# Patient Record
Sex: FEMALE | Race: BLACK OR AFRICAN AMERICAN | NOT HISPANIC OR LATINO | Employment: FULL TIME | URBAN - METROPOLITAN AREA
[De-identification: names, ages, dates, MRNs, and addresses within clinical notes are randomized per-mention and may not be internally consistent; named-entity substitution may affect disease eponyms.]

---

## 2023-06-27 ENCOUNTER — SOCIAL WORK (OUTPATIENT)
Dept: ADMINISTRATIVE | Facility: OTHER | Age: 33
End: 2023-06-27

## 2023-06-27 NOTE — PROGRESS NOTES
SW met with pt regarding initial OB assessment. Pt stated this is her 4th pregnancy/0-miscarriage. Pt stated lives alone with her child-13 and able to perform ADL's independently. Pt stated does work. Pt stated support system is the [facility]. Pt stated does not have medicaid. SW informed pt to go down stair to the financial assistance office and apply for medicaid.. Pt stated does not have WIC. Pt stated not going to breastfeed. SW provide pt with information on other community resources.  No other needs identified at this time.    Aleja Chavez,MSW  Pager#[phone]

## 2023-07-13 ENCOUNTER — PATIENT MESSAGE (OUTPATIENT)
Dept: ADMINISTRATIVE | Facility: OTHER | Age: 33
End: 2023-07-13

## 2023-07-17 PROBLEM — Z34.91 ENCOUNTER FOR SUPERVISION OF LOW-RISK PREGNANCY IN FIRST TRIMESTER: Status: ACTIVE | Noted: 2023-07-17

## 2023-07-20 ENCOUNTER — PATIENT MESSAGE (OUTPATIENT)
Dept: ADMINISTRATIVE | Facility: OTHER | Age: 33
End: 2023-07-20

## 2023-07-24 ENCOUNTER — PATIENT MESSAGE (OUTPATIENT)
Dept: RESEARCH | Facility: HOSPITAL | Age: 33
End: 2023-07-24

## 2023-08-13 PROBLEM — O09.899 RUBELLA NON-IMMUNE STATUS, ANTEPARTUM: Status: ACTIVE | Noted: 2023-08-13

## 2023-08-13 PROBLEM — Z28.39 RUBELLA NON-IMMUNE STATUS, ANTEPARTUM: Status: ACTIVE | Noted: 2023-08-13

## 2023-08-13 PROBLEM — O99.210 OBESITY IN PREGNANCY: Status: ACTIVE | Noted: 2023-08-13

## 2023-08-13 PROBLEM — O99.019 ANTEPARTUM ANEMIA: Status: ACTIVE | Noted: 2023-08-13

## 2023-08-13 PROBLEM — E66.9 OBESITY: Status: ACTIVE | Noted: 2023-08-13

## 2023-08-13 PROBLEM — Z98.890 HISTORY OF LOOP ELECTRICAL EXCISION PROCEDURE (LEEP): Status: ACTIVE | Noted: 2023-08-13

## 2023-08-14 PROBLEM — O99.810 ABNORMAL O'SULLIVAN GLUCOSE CHALLENGE TEST, ANTEPARTUM: Status: ACTIVE | Noted: 2023-08-14

## 2023-09-09 PROBLEM — K59.00 CONSTIPATION DURING PREGNANCY IN SECOND TRIMESTER: Status: ACTIVE | Noted: 2023-09-09

## 2023-09-09 PROBLEM — R10.9 ABDOMINAL PAIN DURING PREGNANCY IN SECOND TRIMESTER: Status: ACTIVE | Noted: 2023-09-09

## 2023-09-09 PROBLEM — O99.612 CONSTIPATION DURING PREGNANCY IN SECOND TRIMESTER: Status: ACTIVE | Noted: 2023-09-09

## 2023-09-09 PROBLEM — O26.892 ABDOMINAL PAIN DURING PREGNANCY IN SECOND TRIMESTER: Status: ACTIVE | Noted: 2023-09-09

## 2023-09-11 PROBLEM — Z34.90 ENCOUNTER FOR SUPERVISION OF LOW-RISK PREGNANCY, ANTEPARTUM: Status: ACTIVE | Noted: 2023-07-17

## 2023-10-02 PROBLEM — O23.40 URINARY TRACT INFECTION IN MOTHER DURING PREGNANCY, ANTEPARTUM: Status: ACTIVE | Noted: 2023-10-02

## 2024-01-01 PROBLEM — O23.40 URINARY TRACT INFECTION IN MOTHER DURING PREGNANCY, ANTEPARTUM: Status: RESOLVED | Noted: 2023-10-02 | Resolved: 2024-01-01

## 2024-02-05 PROBLEM — Z98.891 PREVIOUS CESAREAN SECTION: Status: ACTIVE | Noted: 2024-02-05
